# Patient Record
Sex: FEMALE | Race: WHITE | NOT HISPANIC OR LATINO | Employment: FULL TIME | URBAN - METROPOLITAN AREA
[De-identification: names, ages, dates, MRNs, and addresses within clinical notes are randomized per-mention and may not be internally consistent; named-entity substitution may affect disease eponyms.]

---

## 2013-12-10 LAB
HEP C VIRUS AB: 0.3
HIV: NON REACTIVE

## 2018-06-17 ENCOUNTER — ANESTHESIA EVENT (OUTPATIENT)
Dept: SURGERY | Facility: HOSPITAL | Age: 33
End: 2018-06-17
Payer: COMMERCIAL

## 2018-06-17 ENCOUNTER — ANESTHESIA (OUTPATIENT)
Dept: SURGERY | Facility: HOSPITAL | Age: 33
End: 2018-06-17
Payer: COMMERCIAL

## 2018-06-17 ENCOUNTER — SURGERY (OUTPATIENT)
Age: 33
End: 2018-06-17

## 2018-06-17 ENCOUNTER — HOSPITAL ENCOUNTER (OUTPATIENT)
Facility: HOSPITAL | Age: 33
LOS: 1 days | Discharge: HOME | End: 2018-06-18
Attending: EMERGENCY MEDICINE | Admitting: OBSTETRICS & GYNECOLOGY
Payer: COMMERCIAL

## 2018-06-17 DIAGNOSIS — R10.2 ACUTE PELVIC PAIN, FEMALE: ICD-10-CM

## 2018-06-17 DIAGNOSIS — R10.11 RUQ PAIN: ICD-10-CM

## 2018-06-17 DIAGNOSIS — N83.10 HEMORRHAGE OF CORPUS LUTEUM CYST: Primary | ICD-10-CM

## 2018-06-17 PROBLEM — K66.1 HEMOPERITONEUM: Status: ACTIVE | Noted: 2018-06-17

## 2018-06-17 PROBLEM — D64.9 ANEMIA: Status: ACTIVE | Noted: 2018-06-17

## 2018-06-17 LAB
ABO + RH BLD: NORMAL
ALBUMIN SERPL BCP-MCNC: 3.9 G/DL
ALP SERPL-CCNC: 70 U/L
ALT SERPL W/O P-5'-P-CCNC: 13 U/L
ANION GAP SERPL CALC-SCNC: 8 MMOL/L
AST SERPL-CCNC: 23 U/L
B-HCG UR QL: NEGATIVE
BASOPHILS # BLD AUTO: 0.06 K/UL
BASOPHILS NFR BLD: 0.5 %
BILIRUB SERPL-MCNC: 0.7 MG/DL
BILIRUB UR QL STRIP: NEGATIVE
BLD GP AB SCN CELLS X3 SERPL QL: NORMAL
BUN SERPL-MCNC: 8 MG/DL
CALCIUM SERPL-MCNC: 8.8 MG/DL
CHLORIDE SERPL-SCNC: 104 MMOL/L
CLARITY UR: CLEAR
CO2 SERPL-SCNC: 23 MMOL/L
COLOR UR: YELLOW
CREAT SERPL-MCNC: 0.5 MG/DL
DIFFERENTIAL METHOD: ABNORMAL
EOSINOPHIL # BLD AUTO: 0.1 K/UL
EOSINOPHIL NFR BLD: 0.8 %
ERYTHROCYTE [DISTWIDTH] IN BLOOD BY AUTOMATED COUNT: 12.2 %
EST. GFR  (AFRICAN AMERICAN): >60 ML/MIN/1.73 M^2
EST. GFR  (NON AFRICAN AMERICAN): >60 ML/MIN/1.73 M^2
GLUCOSE SERPL-MCNC: 102 MG/DL
GLUCOSE UR QL STRIP: NEGATIVE
HCG SERPL QL: NEGATIVE
HCT VFR BLD AUTO: 34.9 %
HGB BLD-MCNC: 12.3 G/DL
HGB UR QL STRIP: NEGATIVE
IMM GRANULOCYTES # BLD AUTO: 0.03 K/UL
IMM GRANULOCYTES NFR BLD AUTO: 0.2 %
KETONES UR QL STRIP: NEGATIVE
LEUKOCYTE ESTERASE UR QL STRIP: NEGATIVE
LIPASE SERPL-CCNC: 16 U/L
LYMPHOCYTES # BLD AUTO: 2.1 K/UL
LYMPHOCYTES NFR BLD: 17.5 %
MCH RBC QN AUTO: 30.6 PG
MCHC RBC AUTO-ENTMCNC: 35.2 G/DL
MCV RBC AUTO: 87 FL
MONOCYTES # BLD AUTO: 0.7 K/UL
MONOCYTES NFR BLD: 6.2 %
NEUTROPHILS # BLD AUTO: 9 K/UL
NEUTROPHILS NFR BLD: 74.8 %
NITRITE UR QL STRIP: NEGATIVE
NRBC BLD-RTO: 0 /100 WBC
PH UR STRIP: 7 [PH] (ref 5–8)
PLATELET # BLD AUTO: 274 K/UL
PMV BLD AUTO: 9.6 FL
POTASSIUM SERPL-SCNC: 3.9 MMOL/L
PROT SERPL-MCNC: 7.1 G/DL
PROT UR QL STRIP: NEGATIVE
RBC # BLD AUTO: 4.02 M/UL
SODIUM SERPL-SCNC: 135 MMOL/L
SP GR UR STRIP: 1.01 (ref 1–1.03)
URN SPEC COLLECT METH UR: NORMAL
UROBILINOGEN UR STRIP-ACNC: NEGATIVE EU/DL
WBC # BLD AUTO: 12.03 K/UL

## 2018-06-17 PROCEDURE — 76830 TRANSVAGINAL US NON-OB: CPT | Mod: TC

## 2018-06-17 PROCEDURE — 99285 EMERGENCY DEPT VISIT HI MDM: CPT | Mod: 25

## 2018-06-17 PROCEDURE — 36000709 HC OR TIME LEV III EA ADD 15 MIN: Performed by: OBSTETRICS & GYNECOLOGY

## 2018-06-17 PROCEDURE — 37000008 HC ANESTHESIA 1ST 15 MINUTES: Performed by: OBSTETRICS & GYNECOLOGY

## 2018-06-17 PROCEDURE — 25500020 PHARM REV CODE 255: Performed by: EMERGENCY MEDICINE

## 2018-06-17 PROCEDURE — 76830 TRANSVAGINAL US NON-OB: CPT | Mod: 26,,, | Performed by: RADIOLOGY

## 2018-06-17 PROCEDURE — 85025 COMPLETE CBC W/AUTO DIFF WBC: CPT

## 2018-06-17 PROCEDURE — 80053 COMPREHEN METABOLIC PANEL: CPT

## 2018-06-17 PROCEDURE — 87491 CHLMYD TRACH DNA AMP PROBE: CPT

## 2018-06-17 PROCEDURE — 76705 ECHO EXAM OF ABDOMEN: CPT | Mod: TC

## 2018-06-17 PROCEDURE — 81025 URINE PREGNANCY TEST: CPT

## 2018-06-17 PROCEDURE — 37000009 HC ANESTHESIA EA ADD 15 MINS: Performed by: OBSTETRICS & GYNECOLOGY

## 2018-06-17 PROCEDURE — 71000033 HC RECOVERY, INTIAL HOUR: Performed by: OBSTETRICS & GYNECOLOGY

## 2018-06-17 PROCEDURE — 25000003 PHARM REV CODE 250: Performed by: EMERGENCY MEDICINE

## 2018-06-17 PROCEDURE — 36000708 HC OR TIME LEV III 1ST 15 MIN: Performed by: OBSTETRICS & GYNECOLOGY

## 2018-06-17 PROCEDURE — 63600175 PHARM REV CODE 636 W HCPCS: Performed by: NURSE ANESTHETIST, CERTIFIED REGISTERED

## 2018-06-17 PROCEDURE — 63600175 PHARM REV CODE 636 W HCPCS: Performed by: EMERGENCY MEDICINE

## 2018-06-17 PROCEDURE — 84703 CHORIONIC GONADOTROPIN ASSAY: CPT

## 2018-06-17 PROCEDURE — 25000003 PHARM REV CODE 250: Performed by: NURSE ANESTHETIST, CERTIFIED REGISTERED

## 2018-06-17 PROCEDURE — 76856 US EXAM PELVIC COMPLETE: CPT | Mod: 26,,, | Performed by: RADIOLOGY

## 2018-06-17 PROCEDURE — 76705 ECHO EXAM OF ABDOMEN: CPT | Mod: 26,,, | Performed by: RADIOLOGY

## 2018-06-17 PROCEDURE — D9220A PRA ANESTHESIA: Mod: CRNA,,, | Performed by: NURSE ANESTHETIST, CERTIFIED REGISTERED

## 2018-06-17 PROCEDURE — 96374 THER/PROPH/DIAG INJ IV PUSH: CPT | Mod: 59

## 2018-06-17 PROCEDURE — 25000003 PHARM REV CODE 250: Performed by: OBSTETRICS & GYNECOLOGY

## 2018-06-17 PROCEDURE — D9220A PRA ANESTHESIA: Mod: ANES,,, | Performed by: ANESTHESIOLOGY

## 2018-06-17 PROCEDURE — 83690 ASSAY OF LIPASE: CPT

## 2018-06-17 PROCEDURE — 86850 RBC ANTIBODY SCREEN: CPT

## 2018-06-17 PROCEDURE — 74177 CT ABD & PELVIS W/CONTRAST: CPT | Mod: 26,,, | Performed by: RADIOLOGY

## 2018-06-17 PROCEDURE — 36415 COLL VENOUS BLD VENIPUNCTURE: CPT

## 2018-06-17 PROCEDURE — 96375 TX/PRO/DX INJ NEW DRUG ADDON: CPT

## 2018-06-17 PROCEDURE — G0378 HOSPITAL OBSERVATION PER HR: HCPCS

## 2018-06-17 PROCEDURE — 74177 CT ABD & PELVIS W/CONTRAST: CPT | Mod: TC

## 2018-06-17 PROCEDURE — 81003 URINALYSIS AUTO W/O SCOPE: CPT

## 2018-06-17 PROCEDURE — 27201423 OPTIME MED/SURG SUP & DEVICES STERILE SUPPLY: Performed by: OBSTETRICS & GYNECOLOGY

## 2018-06-17 RX ORDER — SUCCINYLCHOLINE CHLORIDE 20 MG/ML
INJECTION INTRAMUSCULAR; INTRAVENOUS
Status: DISCONTINUED | OUTPATIENT
Start: 2018-06-17 | End: 2018-06-17

## 2018-06-17 RX ORDER — SODIUM CHLORIDE, SODIUM LACTATE, POTASSIUM CHLORIDE, CALCIUM CHLORIDE 600; 310; 30; 20 MG/100ML; MG/100ML; MG/100ML; MG/100ML
1000 INJECTION, SOLUTION INTRAVENOUS
Status: COMPLETED | OUTPATIENT
Start: 2018-06-17 | End: 2018-06-17

## 2018-06-17 RX ORDER — GLYCOPYRROLATE 0.2 MG/ML
INJECTION INTRAMUSCULAR; INTRAVENOUS
Status: DISCONTINUED | OUTPATIENT
Start: 2018-06-17 | End: 2018-06-17

## 2018-06-17 RX ORDER — ONDANSETRON 2 MG/ML
INJECTION INTRAMUSCULAR; INTRAVENOUS
Status: DISCONTINUED | OUTPATIENT
Start: 2018-06-17 | End: 2018-06-17

## 2018-06-17 RX ORDER — BUPIVACAINE HYDROCHLORIDE AND EPINEPHRINE 5; 5 MG/ML; UG/ML
INJECTION, SOLUTION EPIDURAL; INTRACAUDAL; PERINEURAL
Status: DISCONTINUED | OUTPATIENT
Start: 2018-06-17 | End: 2018-06-17 | Stop reason: HOSPADM

## 2018-06-17 RX ORDER — KETOROLAC TROMETHAMINE 30 MG/ML
30 INJECTION, SOLUTION INTRAMUSCULAR; INTRAVENOUS EVERY 6 HOURS
Status: DISCONTINUED | OUTPATIENT
Start: 2018-06-18 | End: 2018-06-18 | Stop reason: HOSPADM

## 2018-06-17 RX ORDER — ONDANSETRON 2 MG/ML
4 INJECTION INTRAMUSCULAR; INTRAVENOUS
Status: COMPLETED | OUTPATIENT
Start: 2018-06-17 | End: 2018-06-17

## 2018-06-17 RX ORDER — SODIUM CHLORIDE, SODIUM LACTATE, POTASSIUM CHLORIDE, CALCIUM CHLORIDE 600; 310; 30; 20 MG/100ML; MG/100ML; MG/100ML; MG/100ML
INJECTION, SOLUTION INTRAVENOUS CONTINUOUS PRN
Status: DISCONTINUED | OUTPATIENT
Start: 2018-06-17 | End: 2018-06-17

## 2018-06-17 RX ORDER — DIPHENHYDRAMINE HYDROCHLORIDE 50 MG/ML
25 INJECTION INTRAMUSCULAR; INTRAVENOUS EVERY 4 HOURS PRN
Status: DISCONTINUED | OUTPATIENT
Start: 2018-06-17 | End: 2018-06-18

## 2018-06-17 RX ORDER — KETOROLAC TROMETHAMINE 30 MG/ML
INJECTION, SOLUTION INTRAMUSCULAR; INTRAVENOUS
Status: DISCONTINUED | OUTPATIENT
Start: 2018-06-17 | End: 2018-06-17

## 2018-06-17 RX ORDER — SODIUM CHLORIDE, SODIUM LACTATE, POTASSIUM CHLORIDE, CALCIUM CHLORIDE 600; 310; 30; 20 MG/100ML; MG/100ML; MG/100ML; MG/100ML
INJECTION, SOLUTION INTRAVENOUS CONTINUOUS
Status: DISCONTINUED | OUTPATIENT
Start: 2018-06-17 | End: 2018-06-18 | Stop reason: HOSPADM

## 2018-06-17 RX ORDER — DIPHENHYDRAMINE HYDROCHLORIDE 50 MG/ML
12.5 INJECTION INTRAMUSCULAR; INTRAVENOUS
Status: DISCONTINUED | OUTPATIENT
Start: 2018-06-17 | End: 2018-06-17 | Stop reason: HOSPADM

## 2018-06-17 RX ORDER — MORPHINE SULFATE 10 MG/ML
4 INJECTION INTRAMUSCULAR; INTRAVENOUS; SUBCUTANEOUS
Status: COMPLETED | OUTPATIENT
Start: 2018-06-17 | End: 2018-06-17

## 2018-06-17 RX ORDER — PROPOFOL 10 MG/ML
VIAL (ML) INTRAVENOUS
Status: DISCONTINUED | OUTPATIENT
Start: 2018-06-17 | End: 2018-06-17

## 2018-06-17 RX ORDER — CEFAZOLIN SODIUM 1 G/3ML
INJECTION, POWDER, FOR SOLUTION INTRAMUSCULAR; INTRAVENOUS
Status: DISCONTINUED | OUTPATIENT
Start: 2018-06-17 | End: 2018-06-17

## 2018-06-17 RX ORDER — HYDROCODONE BITARTRATE AND ACETAMINOPHEN 7.5; 325 MG/1; MG/1
1 TABLET ORAL EVERY 4 HOURS PRN
Status: DISCONTINUED | OUTPATIENT
Start: 2018-06-17 | End: 2018-06-18 | Stop reason: HOSPADM

## 2018-06-17 RX ORDER — ONDANSETRON 2 MG/ML
4 INJECTION INTRAMUSCULAR; INTRAVENOUS DAILY PRN
Status: DISCONTINUED | OUTPATIENT
Start: 2018-06-17 | End: 2018-06-17 | Stop reason: HOSPADM

## 2018-06-17 RX ORDER — HYDROCODONE BITARTRATE AND ACETAMINOPHEN 10; 325 MG/1; MG/1
1 TABLET ORAL EVERY 4 HOURS PRN
Status: DISCONTINUED | OUTPATIENT
Start: 2018-06-17 | End: 2018-06-18 | Stop reason: HOSPADM

## 2018-06-17 RX ORDER — MORPHINE SULFATE 10 MG/ML
2 INJECTION INTRAMUSCULAR; INTRAVENOUS; SUBCUTANEOUS EVERY 5 MIN PRN
Status: DISCONTINUED | OUTPATIENT
Start: 2018-06-17 | End: 2018-06-17 | Stop reason: HOSPADM

## 2018-06-17 RX ORDER — ONDANSETRON 4 MG/1
8 TABLET, ORALLY DISINTEGRATING ORAL EVERY 8 HOURS PRN
Status: DISCONTINUED | OUTPATIENT
Start: 2018-06-17 | End: 2018-06-18 | Stop reason: HOSPADM

## 2018-06-17 RX ORDER — MEPERIDINE HYDROCHLORIDE 50 MG/ML
INJECTION INTRAMUSCULAR; INTRAVENOUS; SUBCUTANEOUS
Status: DISCONTINUED | OUTPATIENT
Start: 2018-06-17 | End: 2018-06-17

## 2018-06-17 RX ORDER — DEXTROAMPHETAMINE SACCHARATE, AMPHETAMINE ASPARTATE MONOHYDRATE, DEXTROAMPHETAMINE SULFATE AND AMPHETAMINE SULFATE 5; 5; 5; 5 MG/1; MG/1; MG/1; MG/1
20 CAPSULE, EXTENDED RELEASE ORAL 2 TIMES DAILY
COMMUNITY
End: 2022-02-02

## 2018-06-17 RX ADMIN — KETOROLAC TROMETHAMINE 30 MG: 30 INJECTION, SOLUTION INTRAMUSCULAR; INTRAVENOUS at 11:06

## 2018-06-17 RX ADMIN — SODIUM CHLORIDE, POTASSIUM CHLORIDE, SODIUM LACTATE AND CALCIUM CHLORIDE: 600; 310; 30; 20 INJECTION, SOLUTION INTRAVENOUS at 08:06

## 2018-06-17 RX ADMIN — ONDANSETRON 4 MG: 2 INJECTION INTRAMUSCULAR; INTRAVENOUS at 02:06

## 2018-06-17 RX ADMIN — GLYCOPYRROLATE 0.4 MG: 0.2 INJECTION INTRAMUSCULAR; INTRAVENOUS at 08:06

## 2018-06-17 RX ADMIN — HYDROCODONE BITARTRATE AND ACETAMINOPHEN 1 TABLET: 7.5; 325 TABLET ORAL at 11:06

## 2018-06-17 RX ADMIN — ONDANSETRON 4 MG: 2 INJECTION INTRAMUSCULAR; INTRAVENOUS at 08:06

## 2018-06-17 RX ADMIN — BUPIVACAINE HYDROCHLORIDE AND EPINEPHRINE 10 ML: 5; 5 INJECTION, SOLUTION EPIDURAL; INTRACAUDAL; PERINEURAL at 08:06

## 2018-06-17 RX ADMIN — PROPOFOL 200 MG: 10 INJECTION, EMULSION INTRAVENOUS at 08:06

## 2018-06-17 RX ADMIN — MORPHINE SULFATE 4 MG: 10 INJECTION INTRAVENOUS at 02:06

## 2018-06-17 RX ADMIN — CEFAZOLIN 1 G: 330 INJECTION, POWDER, FOR SOLUTION INTRAMUSCULAR; INTRAVENOUS at 08:06

## 2018-06-17 RX ADMIN — KETOROLAC TROMETHAMINE 30 MG: 30 INJECTION, SOLUTION INTRAMUSCULAR at 09:06

## 2018-06-17 RX ADMIN — MEPERIDINE HYDROCHLORIDE 50 MG: 50 INJECTION INTRAMUSCULAR; INTRAVENOUS; SUBCUTANEOUS at 08:06

## 2018-06-17 RX ADMIN — SODIUM CHLORIDE, POTASSIUM CHLORIDE, SODIUM LACTATE AND CALCIUM CHLORIDE 1000 ML: 600; 310; 30; 20 INJECTION, SOLUTION INTRAVENOUS at 05:06

## 2018-06-17 RX ADMIN — IOHEXOL 74 ML: 350 INJECTION, SOLUTION INTRAVENOUS at 04:06

## 2018-06-17 RX ADMIN — SUCCINYLCHOLINE CHLORIDE 100 MG: 20 INJECTION, SOLUTION INTRAMUSCULAR; INTRAVENOUS at 08:06

## 2018-06-17 RX ADMIN — ONDANSETRON 8 MG: 4 TABLET, ORALLY DISINTEGRATING ORAL at 11:06

## 2018-06-17 NOTE — ANESTHESIA PREPROCEDURE EVALUATION
06/17/2018  Julieta Pacheco is a 32 y.o., female.    Anesthesia Evaluation    I have reviewed the Patient Summary Reports.    I have reviewed the Nursing Notes.   I have reviewed the Medications.     Review of Systems  Anesthesia Hx:  No previous Anesthesia  Neg history of prior surgery. Denies Family Hx of Anesthesia complications.   Denies Personal Hx of Anesthesia complications.   Social:  Smoker    Hematology/Oncology:  Hematology Normal   Oncology Normal     EENT/Dental:EENT/Dental Normal   Cardiovascular:  Cardiovascular Normal     Pulmonary:  Pulmonary Normal    Renal/:  Renal/ Normal     Hepatic/GI:  Hepatic/GI Normal    Neurological:  Neurology Normal    Endocrine:  Endocrine Normal    Dermatological:  Skin Normal    Psych:  Psychiatric Normal           Physical Exam  General:  Well nourished    Airway/Jaw/Neck:  AIRWAY FINDINGS: Normal      Eyes/Ears/Nose:  EYES/EARS/NOSE FINDINGS: Normal   Dental:  DENTAL FINDINGS: Normal   Chest/Lungs:  Chest/Lungs Clear    Heart/Vascular:  Heart Findings: Normal Heart murmur: negative Vascular Findings: Normal    Abdomen:  Abdomen Findings: Normal    Musculoskeletal:  Musculoskeletal Findings: Normal   Skin:  Skin Findings: Normal    Mental Status:  Mental Status Findings: Normal        Anesthesia Plan  Type of Anesthesia, risks & benefits discussed:  Anesthesia Type:  general  Patient's Preference:   Intra-op Monitoring Plan: standard ASA monitors  Intra-op Monitoring Plan Comments:   Post Op Pain Control Plan:   Post Op Pain Control Plan Comments:   Induction:   IV  Beta Blocker:  Patient is not currently on a Beta-Blocker (No further documentation required).       Informed Consent: Patient understands risks and agrees with Anesthesia plan.  Questions answered. Anesthesia consent signed with patient.  ASA Score: 2  emergent   Day of Surgery Review of History &  Physical:    H&P update referred to the provider.         Ready For Surgery From Anesthesia Perspective.

## 2018-06-17 NOTE — ED PROVIDER NOTES
Encounter Date: 2018       History     Chief Complaint   Patient presents with    Abdominal Pain     32-year-old female  with acute onset of lower abdominal pelvic pain last night.  She reports pain has now progressed into the right upper quadrant and has referred right shoulder pain. She denies any known fever denies any vaginal discharge or vaginal bleeding.  Her last menstrual cycle was  and normal and she has a hormone implant in the right arm.  Past surgical history unremarkable past medical history ADHD.          Review of patient's allergies indicates:  No Known Allergies  History reviewed. No pertinent past medical history.  History reviewed. No pertinent surgical history.  History reviewed. No pertinent family history.  Social History   Substance Use Topics    Smoking status: Current Every Day Smoker     Packs/day: 0.50     Types: Cigarettes    Smokeless tobacco: Never Used    Alcohol use No     Review of Systems   Constitutional: Negative.    Respiratory: Negative.    Cardiovascular: Negative for chest pain.   Gastrointestinal: Positive for abdominal pain and nausea. Negative for blood in stool, constipation, diarrhea, rectal pain and vomiting.   Genitourinary: Positive for pelvic pain. Negative for decreased urine volume, difficulty urinating, dysuria, vaginal bleeding and vaginal discharge.   Musculoskeletal: Negative.    Skin: Negative.    Hematological: Negative.    All other systems reviewed and are negative.      Physical Exam     Initial Vitals [18 1324]   BP Pulse Resp Temp SpO2   (!) 126/90 (!) 124 18 98.1 °F (36.7 °C) 96 %      MAP       --         Physical Exam    Nursing note and vitals reviewed.  Constitutional: She appears well-developed and well-nourished. She is not diaphoretic. No distress.   HENT:   Head: Normocephalic and atraumatic.   Nose: Nose normal.   Mouth/Throat: Oropharynx is clear and moist. No oropharyngeal exudate.   Eyes: Conjunctivae and EOM are  normal. Pupils are equal, round, and reactive to light. Right eye exhibits no discharge. Left eye exhibits no discharge. No scleral icterus.   Neck: Normal range of motion. Neck supple.   Cardiovascular: Regular rhythm, normal heart sounds and intact distal pulses. Tachycardia present.  Exam reveals no gallop and no friction rub.    No murmur heard.  Pulmonary/Chest: Breath sounds normal. No respiratory distress. She has no wheezes. She has no rhonchi. She has no rales.   Abdominal: Soft. Bowel sounds are normal. She exhibits no distension and no mass. There is tenderness. There is guarding (Muscular guarding RLQ and RUQ ). There is no rebound.   Musculoskeletal: Normal range of motion. She exhibits no edema or tenderness.   Lymphadenopathy:     She has no cervical adenopathy.   Neurological: She is alert and oriented to person, place, and time. She has normal strength. No cranial nerve deficit or sensory deficit.   Skin: Skin is warm and dry. Capillary refill takes less than 2 seconds. No rash noted. No erythema. No pallor.   Psychiatric: She has a normal mood and affect. Her behavior is normal. Judgment and thought content normal.         ED Course   Procedures  Labs Reviewed   CBC W/ AUTO DIFFERENTIAL - Abnormal; Notable for the following:        Result Value    Hematocrit 34.9 (*)     Gran # (ANC) 9.0 (*)     Gran% 74.8 (*)     Lymph% 17.5 (*)     All other components within normal limits   COMPREHENSIVE METABOLIC PANEL - Abnormal; Notable for the following:     Sodium 135 (*)     All other components within normal limits   LIPASE   URINALYSIS, REFLEX TO URINE CULTURE   PREGNANCY TEST, URINE RAPID   PREGNANCY TEST SCREENING, SERUM   TYPE & SCREEN          US Abdomen Limited (Gallbladder)    (Results Pending)   US Pelvis Comp with Transvag NON-OB (xpd    (Results Pending)   CT Abdomen Pelvis With Contrast    (Results Pending)     X-Rays:   Other Radiology Reports:   Discussed with Radiology : US of pelvis reveals  Left adnexal complex fluid collection with fluid in the post gutters tracking up     Concerning for ruptured ovarian cyst     Will CT to eval for potential ongoing hemorrhage and to eval for other potential general surgery issues.       US GB - Unremarkable.     Medical Decision Making:   Differential Diagnosis:   Acute abd-pelvic pain with large left adnexal complex fluid collection that may represent ruptured ovarian cyst v infectious etiology    Dr Uribe has been consulted for potential need of diagnostic laparoscopy      Patient is aware.                    ED Course as of Jun 17 1725   Sun Jun 17, 2018   1723 GYN consulted re possible ruptured O cyst - concern on Rad part for ongoing hemorrhage given appearance of US with CT pending.       [KG]      ED Course User Index  [KG] Parish Cardenas MD     Clinical Impression:   The primary encounter diagnosis was Acute pelvic pain, female. A diagnosis of RUQ pain was also pertinent to this visit.                             Parish Cardenas MD  06/17/18 2999

## 2018-06-18 PROBLEM — R10.2 ACUTE PELVIC PAIN, FEMALE: Status: RESOLVED | Noted: 2018-06-17 | Resolved: 2018-06-18

## 2018-06-18 PROBLEM — K66.1 HEMOPERITONEUM: Status: RESOLVED | Noted: 2018-06-17 | Resolved: 2018-06-18

## 2018-06-18 PROBLEM — N83.10 HEMORRHAGE OF CORPUS LUTEUM CYST: Status: RESOLVED | Noted: 2018-06-17 | Resolved: 2018-06-18

## 2018-06-18 LAB
BASOPHILS # BLD AUTO: 0.05 K/UL
BASOPHILS NFR BLD: 0.5 %
DIFFERENTIAL METHOD: ABNORMAL
EOSINOPHIL # BLD AUTO: 0.2 K/UL
EOSINOPHIL NFR BLD: 1.8 %
ERYTHROCYTE [DISTWIDTH] IN BLOOD BY AUTOMATED COUNT: 12.5 %
HCT VFR BLD AUTO: 30.1 %
HGB BLD-MCNC: 10.4 G/DL
IMM GRANULOCYTES # BLD AUTO: 0.04 K/UL
IMM GRANULOCYTES NFR BLD AUTO: 0.4 %
LYMPHOCYTES # BLD AUTO: 2.3 K/UL
LYMPHOCYTES NFR BLD: 24 %
MCH RBC QN AUTO: 30.9 PG
MCHC RBC AUTO-ENTMCNC: 34.6 G/DL
MCV RBC AUTO: 89 FL
MONOCYTES # BLD AUTO: 0.6 K/UL
MONOCYTES NFR BLD: 6.7 %
NEUTROPHILS # BLD AUTO: 6.3 K/UL
NEUTROPHILS NFR BLD: 66.6 %
NRBC BLD-RTO: 0 /100 WBC
PLATELET # BLD AUTO: 227 K/UL
PMV BLD AUTO: 9.4 FL
RBC # BLD AUTO: 3.37 M/UL
WBC # BLD AUTO: 9.51 K/UL

## 2018-06-18 PROCEDURE — 25000003 PHARM REV CODE 250: Performed by: OBSTETRICS & GYNECOLOGY

## 2018-06-18 PROCEDURE — 85025 COMPLETE CBC W/AUTO DIFF WBC: CPT

## 2018-06-18 PROCEDURE — 36415 COLL VENOUS BLD VENIPUNCTURE: CPT

## 2018-06-18 PROCEDURE — 63600175 PHARM REV CODE 636 W HCPCS: Performed by: OBSTETRICS & GYNECOLOGY

## 2018-06-18 PROCEDURE — G0378 HOSPITAL OBSERVATION PER HR: HCPCS

## 2018-06-18 RX ORDER — DIPHENHYDRAMINE HYDROCHLORIDE 50 MG/ML
25 INJECTION INTRAMUSCULAR; INTRAVENOUS EVERY 4 HOURS PRN
Status: DISCONTINUED | OUTPATIENT
Start: 2018-06-18 | End: 2018-06-18 | Stop reason: HOSPADM

## 2018-06-18 RX ORDER — HYDROCODONE BITARTRATE AND ACETAMINOPHEN 7.5; 325 MG/1; MG/1
1 TABLET ORAL EVERY 4 HOURS PRN
Qty: 25 TABLET | Refills: 0 | Status: SHIPPED | OUTPATIENT
Start: 2018-06-18 | End: 2018-12-17

## 2018-06-18 RX ORDER — IBUPROFEN 800 MG/1
800 TABLET ORAL EVERY 8 HOURS PRN
Qty: 30 TABLET | Refills: 1 | Status: SHIPPED | OUTPATIENT
Start: 2018-06-18 | End: 2018-12-17

## 2018-06-18 RX ORDER — AMOXICILLIN 250 MG
1 CAPSULE ORAL 2 TIMES DAILY
COMMUNITY
Start: 2018-06-18 | End: 2018-12-17

## 2018-06-18 RX ADMIN — HYDROCODONE BITARTRATE AND ACETAMINOPHEN 1 TABLET: 7.5; 325 TABLET ORAL at 05:06

## 2018-06-18 RX ADMIN — SODIUM CHLORIDE, POTASSIUM CHLORIDE, SODIUM LACTATE AND CALCIUM CHLORIDE: 600; 310; 30; 20 INJECTION, SOLUTION INTRAVENOUS at 12:06

## 2018-06-18 RX ADMIN — KETOROLAC TROMETHAMINE 30 MG: 30 INJECTION, SOLUTION INTRAMUSCULAR; INTRAVENOUS at 05:06

## 2018-06-18 NOTE — PLAN OF CARE
06/18/18 0858   Final Note   Assessment Type Final Discharge Note   Discharge Disposition Home   What phone number can be called within the next 1-3 days to see how you are doing after discharge? [phone]   Hospital Follow Up  Appt(s) scheduled? Yes   Discharge plans and expectations educations in teach back method with documentation complete? Yes

## 2018-06-18 NOTE — SUBJECTIVE & OBJECTIVE
Obstetric History     No data available        History reviewed. No pertinent past medical history.  History reviewed. No pertinent surgical history.    PTA Medications   Medication Sig    dextroamphetamine-amphetamine (ADDERALL XR) 20 MG 24 hr capsule Take 20 mg by mouth 2 (two) times daily.       Review of patient's allergies indicates:  No Known Allergies     Family History     None        Social History Main Topics    Smoking status: Current Every Day Smoker     Packs/day: 0.50     Types: Cigarettes    Smokeless tobacco: Never Used    Alcohol use No    Drug use: No    Sexual activity: Not Currently     Review of Systems   Constitutional: Positive for appetite change.   Respiratory: Negative.    Cardiovascular: Negative.    Gastrointestinal: Positive for abdominal pain.   Genitourinary: Positive for pelvic pain. Negative for vaginal bleeding, vaginal discharge and vaginal odor.   Musculoskeletal: Negative.    Skin:  Negative.   Neurological: Negative.    Psychiatric/Behavioral: Negative.       Objective:     Vital Signs (Most Recent):  Temp: 98.7 °F (37.1 °C) (06/17/18 1747)  Pulse: 87 (06/17/18 1747)  Resp: 18 (06/17/18 1747)  BP: (!) 126/90 (06/17/18 1324)  SpO2: 98 % (06/17/18 1747) Vital Signs (24h Range):  Temp:  [98.1 °F (36.7 °C)-98.7 °F (37.1 °C)] 98.7 °F (37.1 °C)  Pulse:  [] 87  Resp:  [18] 18  SpO2:  [96 %-98 %] 98 %  BP: (126)/(90) 126/90     Weight: 77.1 kg (170 lb)  Body mass index is 25.1 kg/m².    Patient's last menstrual period was 06/04/2018 (approximate).    Physical Exam:   Constitutional: She is oriented to person, place, and time. She appears well-developed and well-nourished.    HENT:   Head: Normocephalic and atraumatic.    Eyes: EOM are normal. Pupils are equal, round, and reactive to light.    Neck: Normal range of motion. Neck supple.    Cardiovascular: Normal rate, regular rhythm, normal heart sounds and intact distal pulses.     Pulmonary/Chest: Effort normal and  breath sounds normal.        Abdominal: There is tenderness in the right upper quadrant, right lower quadrant and left lower quadrant. There is rebound and guarding.     Genitourinary: Uterus normal. Right adnexum displays tenderness. No vaginal discharge found. Cervix exhibits motion tenderness.           Musculoskeletal: Normal range of motion.       Neurological: She is alert and oriented to person, place, and time.    Skin: Skin is warm and dry.    Psychiatric: She has a normal mood and affect. Her behavior is normal. Judgment and thought content normal.       Laboratory:  Beta HCG: No results for input(s): HCGPREGUR in the last 48 hours.  CBC:   Recent Labs  Lab 06/17/18  1412   WBC 12.03   RBC 4.02   HGB 12.3   HCT 34.9*      MCV 87   MCH 30.6   MCHC 35.2     CMP:   Recent Labs  Lab 06/17/18  1412      CALCIUM 8.8   ALBUMIN 3.9   PROT 7.1   *   K 3.9   CO2 23      BUN 8   CREATININE 0.5   ALKPHOS 70   ALT 13   AST 23   BILITOT 0.7       Diagnostic Results:  CT: Reviewed  complex fluid in the pelvis to paracolic gutters

## 2018-06-18 NOTE — BRIEF OP NOTE
[facility] - Periop Services  Brief Operative Note     SUMMARY     Surgery Date: 2018     Surgeon(s) and Role:     * Win Uribe MD - Primary    Assisting Surgeon: None    Pre-op Diagnosis:  Abdominal pain, unspecified abdominal location [R10.9]    Post-op Diagnosis:  Post-Op Diagnosis Codes:     * Abdominal pain, unspecified abdominal location [R10.9]    Procedure(s) (LRB):  LAPAROSCOPY, DIAGNOSTIC (N/A)    Anesthesia: General    Description of the findings of the procedure: 300 cc hemoperitoneum evacuated. Left ovary with stoma    Findings/Key Components: no complications    Estimated Blood Loss: 50 mL         Specimens:   Specimen (12h ago through future)    None          Discharge Note    SUMMARY     Admit Date: 2018    Discharge Date and Time: 2018    Hospital Course (synopsis of major diagnoses, care, treatment, and services provided during the course of the hospital stay): 33 yo  LMP 2 weeks ago presents with c/o abdominal/pelvic pain especially in the RUQ. No fevers no d/c no douching has Nexplanon for BC. Pain has worsened over past few days.  CT revealed complex fluid in the lower pelvis into the paracolic gutters    Final Diagnosis: Post-Op Diagnosis Codes:     * Abdominal pain, unspecified abdominal location [R10.9]  Ruptured Corpus Luteum      Disposition: Home or Self Care    Follow Up/Patient Instructions: 2 weeks with Dr Uribe    Medications:  Reconciled Home Medications:      Medication List      ASK your doctor about these medications    dextroamphetamine-amphetamine 20 MG 24 hr capsule  Commonly known as:  ADDERALL XR  Take 20 mg by mouth 2 (two) times daily.          No discharge procedures on file.

## 2018-06-18 NOTE — H&P
[facility] - Peri Services  Obstetrics & Gynecology  History & Physical    Patient Name: Julieta Pacheco  MRN: [id number]  Admission Date: 2018  Primary Care Provider: Lamin Moura Jr, MD    Subjective:     Chief Complaint/Reason for Admission: abd/pelvic pain   History of Present Illness:  31 yo  LMP 2 weeks ago presents with c/o abdominal/pelvic pain especially in the RUQ. No fevers no d/c no douching has Nexplanon for BC. Pain has worsened over past few days.  CT revealed complex fluid in the lower pelvis into the paracolic gutters.        Obstetric History     No data available        History reviewed. No pertinent past medical history.  History reviewed. No pertinent surgical history.    PTA Medications   Medication Sig    dextroamphetamine-amphetamine (ADDERALL XR) 20 MG 24 hr capsule Take 20 mg by mouth 2 (two) times daily.       Review of patient's allergies indicates:  No Known Allergies     Family History     None        Social History Main Topics    Smoking status: Current Every Day Smoker     Packs/day: 0.50     Types: Cigarettes    Smokeless tobacco: Never Used    Alcohol use No    Drug use: No    Sexual activity: Not Currently     Review of Systems   Constitutional: Positive for appetite change.   Respiratory: Negative.    Cardiovascular: Negative.    Gastrointestinal: Positive for abdominal pain.   Genitourinary: Positive for pelvic pain. Negative for vaginal bleeding, vaginal discharge and vaginal odor.   Musculoskeletal: Negative.    Skin:  Negative.   Neurological: Negative.    Psychiatric/Behavioral: Negative.       Objective:     Vital Signs (Most Recent):  Temp: 98.7 °F (37.1 °C) (18 174)  Pulse: 87 (18 174)  Resp: 18 (18 174)  BP: (!) 126/90 (18 1324)  SpO2: 98 % (18 174) Vital Signs (24h Range):  Temp:  [98.1 °F (36.7 °C)-98.7 °F (37.1 °C)] 98.7 °F (37.1 °C)  Pulse:  [] 87  Resp:  [18] 18  SpO2:  [96 %-98 %] 98 %  BP:  (126)/(90) 126/90     Weight: 77.1 kg (170 lb)  Body mass index is 25.1 kg/m².    Patient's last menstrual period was 06/04/2018 (approximate).    Physical Exam:   Constitutional: She is oriented to person, place, and time. She appears well-developed and well-nourished.    HENT:   Head: Normocephalic and atraumatic.    Eyes: EOM are normal. Pupils are equal, round, and reactive to light.    Neck: Normal range of motion. Neck supple.    Cardiovascular: Normal rate, regular rhythm, normal heart sounds and intact distal pulses.     Pulmonary/Chest: Effort normal and breath sounds normal.        Abdominal: There is tenderness in the right upper quadrant, right lower quadrant and left lower quadrant. There is rebound and guarding.     Genitourinary: Uterus normal. Right adnexum displays tenderness. No vaginal discharge found. Cervix exhibits motion tenderness.           Musculoskeletal: Normal range of motion.       Neurological: She is alert and oriented to person, place, and time.    Skin: Skin is warm and dry.    Psychiatric: She has a normal mood and affect. Her behavior is normal. Judgment and thought content normal.       Laboratory:  Beta HCG: No results for input(s): HCGPREGUR in the last 48 hours.  CBC:   Recent Labs  Lab 06/17/18  1412   WBC 12.03   RBC 4.02   HGB 12.3   HCT 34.9*      MCV 87   MCH 30.6   MCHC 35.2     CMP:   Recent Labs  Lab 06/17/18  1412      CALCIUM 8.8   ALBUMIN 3.9   PROT 7.1   *   K 3.9   CO2 23      BUN 8   CREATININE 0.5   ALKPHOS 70   ALT 13   AST 23   BILITOT 0.7       Diagnostic Results:  CT: Reviewed  complex fluid in the pelvis to paracolic gutters    Assessment/Plan:     Ruptured Corpus Luteum, Hemoperitoneum   Consents on chart..... To OR for DX LAP     Win Uribe MD  Obstetrics & Gynecology  [facility] - Periop Services

## 2018-06-18 NOTE — DISCHARGE SUMMARY
[facility] - Post Partum  Obstetrics & Gynecology  Discharge Summary    Patient Name: Julieta Pacheco  MRN: [id number]  Admission Date: 2018  Hospital Length of Stay: 1 days  Discharge Date and Time:  2018 7:58 AM  Attending Physician: No att. providers found   Discharging Provider: Win Uribe MD  Primary Care Provider: Lamin Moura Jr, MD    HPI:33 yo  LMP 2 weeks ago presents with c/o abdominal/pelvic pain especially in the RUQ. No fevers no d/c no douching has Nexplanon for BC. Pain has worsened over past few days.  CT revealed complex fluid in the lower pelvis into the paracolic gutters.       Hospital Course:  Pt had DX LAP with evacuation of hemoperitoneum  Procedure(s) (LRB):  LAPAROSCOPY, DIAGNOSTIC (N/A)     Consults:     Significant Diagnostic Studies: Radiology: CT scan: CT ABDOMEN PELVIS WITH CONTRAST: No results found for this visit on 18.  Ultrasound: complex fluid in pelvis and into paracolic gutters    Pending Diagnostic Studies:     Procedure Component Value Units Date/Time    CT Abdomen Pelvis With Contrast [[id number]] Updated:  18 1724    Order Status:  Sent Lab Status:  In process     US Abdomen Limited (Gallbladder) [[id number]] Updated:  18 1550    Order Status:  Sent Lab Status:  In process     US Pelvis Comp with Transvag NON-OB (xpd [[id number]] Updated:  18 1550    Order Status:  Sent Lab Status:  In process         Final Active Diagnoses:    Diagnosis Date Noted POA    PRINCIPAL PROBLEM:  Hemorrhage of corpus luteum cyst [N83.10] 2018 Yes    Anemia [D64.9] 2018 Yes    Hemoperitoneum [K66.1] 2018 Yes    Acute pelvic pain, female [R10.2] 2018 Yes      Problems Resolved During this Admission:    Diagnosis Date Noted Date Resolved POA        Discharged Condition: stable    Disposition: Home or Self Care    Follow Up:  Follow-up Information     Win Uribe MD. Schedule an appointment as soon as  possible for a visit in 2 weeks.    Specialty:  Obstetrics  Contact information:  [address]  [facility] [place] [address] [phone]                 Patient Instructions:     Diet general     Activity as tolerated     Other restrictions (specify):   Order Comments: Do not have sex, use tampons, or douche     Call MD for:  temperature >100.4     Call MD for:  persistent nausea and vomiting     Call MD for:  severe uncontrolled pain     Call MD for:  difficulty breathing, headache or visual disturbances     Call MD for:  redness, tenderness, or signs of infection (pain, swelling, redness, odor or green/yellow discharge around incision site)       Medications:  Reconciled Home Medications:      Medication List      START taking these medications    HYDROcodone-acetaminophen 7.5-325 mg per tablet  Commonly known as:  NORCO  Take 1 tablet by mouth every 4 (four) hours as needed.     ibuprofen 800 MG tablet  Commonly known as:  ADVIL,MOTRIN  Take 1 tablet (800 mg total) by mouth every 8 (eight) hours as needed for Pain.     senna-docusate 8.6-50 mg 8.6-50 mg per tablet  Commonly known as:  PERICOLACE  Take 1 tablet by mouth 2 (two) times daily.        CONTINUE taking these medications    dextroamphetamine-amphetamine 20 MG 24 hr capsule  Commonly known as:  ADDERALL XR  Take 20 mg by mouth 2 (two) times daily.            Win Uribe MD  Obstetrics & Gynecology  [facility] - Post Partum

## 2018-06-18 NOTE — TRANSFER OF CARE
"Anesthesia Transfer of Care Note    Patient: Julieta Pacheco    Procedure(s) Performed: Procedure(s) (LRB):  LAPAROSCOPY, DIAGNOSTIC (N/A)    Patient location: PACU    Anesthesia Type: general    Transport from OR: Transported from OR on room air with adequate spontaneous ventilation    Post pain: adequate analgesia    Post assessment: no apparent anesthetic complications and tolerated procedure well    Post vital signs: stable    Level of consciousness: awake, alert and oriented    Nausea/Vomiting: no nausea/vomiting    Complications: none    Transfer of care protocol was followed      Last vitals:   Visit Vitals  BP (!) 126/90 (BP Location: Left arm, Patient Position: Sitting)   Pulse 87   Temp 37.1 °C (98.7 °F) (Oral)   Resp 18   Ht 5' 9" (1.753 m)   Wt 77.1 kg (170 lb)   LMP 06/04/2018 (Approximate)   SpO2 98%   BMI 25.10 kg/m²     "

## 2018-06-18 NOTE — OP NOTE
DATE OF PROCEDURE:  06/17/2018.    PREOPERATIVE DIAGNOSIS:  Abdominal pelvic pain, hemoperitoneum.    POSTOPERATIVE DIAGNOSIS:  Ruptured hemorrhagic corpus luteum and  hemoperitoneum.    PROCEDURE PERFORMED:  Diagnostic laparoscopy with evacuation of  hemoperitoneum.    ANESTHESIA:  General endotracheal anesthesia.    ESTIMATED BLOOD LOSS:  Approximately 300 mL of hemoperitoneum evacuated.    DESCRIPTION OF FINDINGS:  The left ovary had a hemorrhagic stoma.  No other  abnormal findings were noted.  The liver edge and gallbladder were normal.   The appendix was within normal limits.  There was no evidence of  endometriosis.  Both tubes and ovaries were normal except for the stoma  noted.  There were some filmy adhesions of that left ovary to the pelvic  sidewall.    ANESTHESIA:  General endotracheal anesthesia.    PROCEDURE IN DETAIL:  After assuring informed consent, the patient was  taken to the OR where general anesthesia was initiated.  She was placed in  the adjustable Andres stirrups and her abdomen and perineum were prepped and  draped in the usual sterile fashion.  A Saucedo catheter was placed inside  the bladder.  A sterile speculum was placed inside the vagina and a Hulka  clamp was placed inside the uterus for manipulation.  Instruments were  removed from the vagina.  The speculum was removed from the vagina.  Gloves  were changed and attention was turned to the patient's abdomen.  Towel  clamps were placed periumbilically and a Veress needle was introduced.  The  abdomen was insufflated to 50 mmHg.  A 5-mm incision was made in the  umbilicus and a 5 mm trocar and sleeve were advanced.  Intra-abdominal  placement was assured with direct laparoscopic visualization.  The above  findings were noted.  Next, a 5-mm incision was made in the suprapubic  region and 5-mm trocar and sleeve were advanced under direct laparoscopic  visualization.  Copious irrigation was used to suction the hemoperitoneum.   The pelvic  anatomy was explored and the left stoma was noted.  The spatula  was used to cauterize the stoma site.  Good hemostasis was achieved.  At  this point, all instruments were removed from the patient's abdomen.  The  gas was removed from the patient's abdomen and additional positive pressure  ventilation was used to remove the pneumoperitoneum.  The skin was  reapproximated with interrupted 4-0 Vicryl sutures and all instruments were  removed from the patient's vagina.  She was taken out of the adjustable  Andres stirrups and placed in supine position.  She was awakened from  anesthesia and taken to the Recovery Room in stable condition.  All counts  were correct x2.  The patient tolerated the procedure well and was taken to  Recovery Room in stable condition.  There were no other complications.        NMD/IN dd: 06/18/2018 09:11:38 (CDT)   td: 06/18/2018 12:07:22 (CDT)  Doc ID #[id number]   Job ID #[id number]    CC:

## 2018-06-18 NOTE — HOSPITAL COURSE
31 yo  LMP 2 weeks ago presents with c/o abdominal/pelvic pain especially in the RUQ. No fevers no d/c no douching has Nexplanon for BC. Pain has worsened over past few days.  CT revealed complex fluid in the lower pelvis into the paracolic gutters.

## 2018-06-18 NOTE — PLAN OF CARE
06/18/18 0856   Discharge Assessment   Assessment Type Discharge Planning Assessment   Confirmed/corrected address and phone number on facesheet? Yes   Assessment information obtained from? Patient   Expected Length of Stay (days) 1   Communicated expected length of stay with patient/caregiver yes   Prior to hospitilization cognitive status: Alert/Oriented   Prior to hospitalization functional status: Independent   Current cognitive status: Alert/Oriented   Current Functional Status: Independent   Lives With child(ronen), dependent;parent(s);sibling(s)   Able to Return to Prior Arrangements yes   Is patient able to care for self after discharge? Yes   Who are your caregiver(s) and their phone number(s)? Sister Jad Rendon [phone]   Patient's perception of discharge disposition home or selfcare   Readmission Within The Last 30 Days no previous admission in last 30 days   Patient currently being followed by outpatient case management? No   Patient currently receives any other outside agency services? No   Equipment Currently Used at Home none   Do you have any problems affording any of your prescribed medications? No   Is the patient taking medications as prescribed? yes   Does the patient have transportation home? Yes   Transportation Available none;car   Does the patient receive services at the [facility]? No   Discharge Plan A Home

## 2018-06-18 NOTE — NURSING
Patient discharged with all belongings. She is stable. Voiding without difficulty, passing flatus, tolerating Po. Pt verbalized understanding of all instructions. All questions answered. Escorted to car.

## 2018-06-18 NOTE — ANESTHESIA POSTPROCEDURE EVALUATION
"Anesthesia Post Evaluation    Patient: Julieta Pacheco    Procedure(s) Performed: Procedure(s) (LRB):  LAPAROSCOPY, DIAGNOSTIC (N/A)    Final Anesthesia Type: general  Patient location during evaluation: PACU  Patient participation: Yes- Able to Participate  Level of consciousness: awake and awake and alert  Post-procedure vital signs: reviewed and stable  Pain management: adequate  Airway patency: patent  PONV status at discharge: No PONV  Anesthetic complications: no      Cardiovascular status: blood pressure returned to baseline  Respiratory status: unassisted and spontaneous ventilation  Hydration status: euvolemic  Follow-up not needed.        Visit Vitals  BP (!) 126/90 (BP Location: Left arm, Patient Position: Sitting)   Pulse 87   Temp 37.1 °C (98.7 °F) (Oral)   Resp 18   Ht 5' 9" (1.753 m)   Wt 77.1 kg (170 lb)   LMP 06/04/2018 (Approximate)   SpO2 98%   BMI 25.10 kg/m²       Pain/Lisa Score: Pain Rating Prior to Med Admin: 7 (6/17/2018  2:05 PM)      "
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
## 2018-06-19 LAB
C TRACH DNA SPEC QL NAA+PROBE: NOT DETECTED
N GONORRHOEA DNA SPEC QL NAA+PROBE: NOT DETECTED

## 2018-07-10 VITALS
WEIGHT: 170 LBS | HEART RATE: 71 BPM | OXYGEN SATURATION: 99 % | DIASTOLIC BLOOD PRESSURE: 61 MMHG | SYSTOLIC BLOOD PRESSURE: 100 MMHG | HEIGHT: 69 IN | RESPIRATION RATE: 18 BRPM | TEMPERATURE: 98 F | BODY MASS INDEX: 25.18 KG/M2

## 2018-12-17 ENCOUNTER — HOSPITAL ENCOUNTER (EMERGENCY)
Facility: HOSPITAL | Age: 33
Discharge: HOME | End: 2018-12-17
Attending: EMERGENCY MEDICINE
Payer: COMMERCIAL

## 2018-12-17 VITALS
SYSTOLIC BLOOD PRESSURE: 121 MMHG | WEIGHT: 165 LBS | BODY MASS INDEX: 24.44 KG/M2 | RESPIRATION RATE: 18 BRPM | TEMPERATURE: 98 F | HEART RATE: 79 BPM | HEIGHT: 69 IN | OXYGEN SATURATION: 99 % | DIASTOLIC BLOOD PRESSURE: 86 MMHG

## 2018-12-17 DIAGNOSIS — S60.212A CONTUSION OF LEFT WRIST, INITIAL ENCOUNTER: ICD-10-CM
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
## 2018-12-17 DIAGNOSIS — S16.1XXA CERVICAL STRAIN, ACUTE, INITIAL ENCOUNTER: Primary | ICD-10-CM

## 2018-12-17 DIAGNOSIS — M54.2 NECK PAIN: ICD-10-CM

## 2018-12-17 PROCEDURE — 99284 EMERGENCY DEPT VISIT MOD MDM: CPT | Mod: 25

## 2018-12-17 PROCEDURE — 72050 X-RAY EXAM NECK SPINE 4/5VWS: CPT | Mod: 26,,, | Performed by: RADIOLOGY

## 2018-12-17 PROCEDURE — 73110 X-RAY EXAM OF WRIST: CPT | Mod: TC,FY,LT

## 2018-12-17 PROCEDURE — 72050 X-RAY EXAM NECK SPINE 4/5VWS: CPT | Mod: TC,FY

## 2018-12-17 PROCEDURE — 73110 X-RAY EXAM OF WRIST: CPT | Mod: 26,LT,, | Performed by: RADIOLOGY

## 2018-12-17 RX ORDER — CITALOPRAM 20 MG/1
20 TABLET, FILM COATED ORAL DAILY
COMMUNITY

## 2018-12-17 RX ORDER — METHOCARBAMOL 750 MG/1
750 TABLET, FILM COATED ORAL 4 TIMES DAILY
Qty: 40 TABLET | Refills: 0 | Status: SHIPPED | OUTPATIENT
Start: 2018-12-17 | End: 2018-12-27

## 2018-12-18 NOTE — ED NOTES
DISCHARGE INSTRUCTIONS GIVEN, DISCUSSED MEDICATIONS AND FOLLOW UP CARE, PATIENT VERBALIZED UNDERSTANDING, NO QUESTIONS OR COMPLAINTS AT TIME, ENCOURAGED TO RETURN FOR NEW OR WORSENING SYMPTOMS. PATIENT ESCORTED TO REGISTRATION W/O SRINI. DAREN TAI RN
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
## 2020-07-16 LAB
CHOLEST SERPL-MSCNC: 128 MG/DL (ref 0–200)
HDLC SERPL-MCNC: 53 MG/DL
LDLC SERPL CALC-MCNC: 61 MG/DL
TRIGL SERPL-MCNC: 41 MG/DL

## 2021-08-15 ENCOUNTER — HOSPITAL ENCOUNTER (EMERGENCY)
Facility: HOSPITAL | Age: 36
Discharge: LEFT BEFORE DISCHARGE | End: 2021-08-15
Attending: EMERGENCY MEDICINE
Payer: COMMERCIAL

## 2021-08-15 VITALS
RESPIRATION RATE: 16 BRPM | TEMPERATURE: 98 F | SYSTOLIC BLOOD PRESSURE: 109 MMHG | WEIGHT: 192 LBS | BODY MASS INDEX: 28.44 KG/M2 | DIASTOLIC BLOOD PRESSURE: 75 MMHG | OXYGEN SATURATION: 98 % | HEIGHT: 69 IN | HEART RATE: 82 BPM

## 2021-08-15 DIAGNOSIS — R09.89 RUNNY NOSE: Primary | ICD-10-CM

## 2021-08-15 LAB — SARS-COV-2 RDRP RESP QL NAA+PROBE: NEGATIVE

## 2021-08-15 PROCEDURE — U0002 COVID-19 LAB TEST NON-CDC: HCPCS | Performed by: PHYSICIAN ASSISTANT

## 2021-08-15 PROCEDURE — 99282 EMERGENCY DEPT VISIT SF MDM: CPT

## 2021-08-15 RX ORDER — ARIPIPRAZOLE 5 MG/1
5 TABLET ORAL
COMMUNITY
Start: 2021-04-14

## 2021-10-13 ENCOUNTER — OFFICE VISIT (OUTPATIENT)
Dept: OBSTETRICS AND GYNECOLOGY | Facility: CLINIC | Age: 36
End: 2021-10-13
Payer: COMMERCIAL

## 2021-10-13 VITALS
SYSTOLIC BLOOD PRESSURE: 126 MMHG | DIASTOLIC BLOOD PRESSURE: 84 MMHG | WEIGHT: 193 LBS | HEIGHT: 70 IN | BODY MASS INDEX: 27.63 KG/M2

## 2021-10-13 DIAGNOSIS — Z00.00 WELLNESS EXAMINATION: Primary | ICD-10-CM

## 2021-10-13 LAB
B-HCG UR QL: NEGATIVE
CTP QC/QA: YES

## 2021-10-13 PROCEDURE — 87491 CHLMYD TRACH DNA AMP PROBE: CPT | Performed by: NURSE PRACTITIONER

## 2021-10-13 PROCEDURE — 96372 THER/PROPH/DIAG INJ SC/IM: CPT | Mod: S$GLB,,, | Performed by: NURSE PRACTITIONER

## 2021-10-13 PROCEDURE — 99385 PR PREVENTIVE VISIT,NEW,18-39: ICD-10-PCS | Mod: 25,S$GLB,, | Performed by: NURSE PRACTITIONER

## 2021-10-13 PROCEDURE — 81025 URINE PREGNANCY TEST: CPT | Mod: S$GLB,,, | Performed by: NURSE PRACTITIONER

## 2021-10-13 PROCEDURE — 87591 N.GONORRHOEAE DNA AMP PROB: CPT | Performed by: NURSE PRACTITIONER

## 2021-10-13 PROCEDURE — 99385 PREV VISIT NEW AGE 18-39: CPT | Mod: 25,S$GLB,, | Performed by: NURSE PRACTITIONER

## 2021-10-13 PROCEDURE — 81025 POCT URINE PREGNANCY: ICD-10-PCS | Mod: S$GLB,,, | Performed by: NURSE PRACTITIONER

## 2021-10-13 PROCEDURE — 96372 PR INJECTION,THERAP/PROPH/DIAG2ST, IM OR SUBCUT: ICD-10-PCS | Mod: S$GLB,,, | Performed by: NURSE PRACTITIONER

## 2021-10-13 RX ORDER — MEDROXYPROGESTERONE ACETATE 150 MG/ML
150 INJECTION, SUSPENSION INTRAMUSCULAR
Status: SHIPPED | OUTPATIENT
Start: 2021-10-13 | End: 2023-01-06

## 2021-10-13 RX ADMIN — MEDROXYPROGESTERONE ACETATE 150 MG: 150 INJECTION, SUSPENSION INTRAMUSCULAR at 09:10

## 2021-10-14 LAB
C TRACH DNA SPEC QL NAA+PROBE: NOT DETECTED
N GONORRHOEA DNA SPEC QL NAA+PROBE: NOT DETECTED

## 2021-11-09 ENCOUNTER — PATIENT OUTREACH (OUTPATIENT)
Dept: ADMINISTRATIVE | Facility: HOSPITAL | Age: 36
End: 2021-11-09
Payer: COMMERCIAL

## 2021-11-09 ENCOUNTER — OFFICE VISIT (OUTPATIENT)
Dept: FAMILY MEDICINE | Facility: CLINIC | Age: 36
End: 2021-11-09
Payer: COMMERCIAL

## 2021-11-09 VITALS
OXYGEN SATURATION: 98 % | DIASTOLIC BLOOD PRESSURE: 77 MMHG | HEIGHT: 70 IN | WEIGHT: 191.38 LBS | HEART RATE: 91 BPM | BODY MASS INDEX: 27.4 KG/M2 | SYSTOLIC BLOOD PRESSURE: 123 MMHG

## 2021-11-09 DIAGNOSIS — R91.8 OTHER NONSPECIFIC ABNORMAL FINDING OF LUNG FIELD: ICD-10-CM

## 2021-11-09 DIAGNOSIS — R91.8 MULTIPLE SUBSOLID LUNG NODULES LESS THAN 6 MM IN DIAMETER: ICD-10-CM

## 2021-11-09 DIAGNOSIS — Z76.89 ESTABLISHING CARE WITH NEW DOCTOR, ENCOUNTER FOR: Primary | ICD-10-CM

## 2021-11-09 LAB
ALBUMIN SERPL BCP-MCNC: 4.3 G/DL (ref 3.5–5.2)
ALP SERPL-CCNC: 64 U/L (ref 55–135)
ALT SERPL W/O P-5'-P-CCNC: 21 U/L (ref 10–44)
ANION GAP SERPL CALC-SCNC: 8 MMOL/L (ref 8–16)
AST SERPL-CCNC: 15 U/L (ref 10–40)
BILIRUB SERPL-MCNC: 0.2 MG/DL (ref 0.1–1)
BUN SERPL-MCNC: 8 MG/DL (ref 6–20)
CALCIUM SERPL-MCNC: 8.8 MG/DL (ref 8.7–10.5)
CHLORIDE SERPL-SCNC: 110 MMOL/L (ref 95–110)
CO2 SERPL-SCNC: 20 MMOL/L (ref 23–29)
CREAT SERPL-MCNC: 0.7 MG/DL (ref 0.5–1.4)
EST. GFR  (AFRICAN AMERICAN): >60 ML/MIN/1.73 M^2
EST. GFR  (NON AFRICAN AMERICAN): >60 ML/MIN/1.73 M^2
GLUCOSE SERPL-MCNC: 107 MG/DL (ref 70–110)
POTASSIUM SERPL-SCNC: 3.8 MMOL/L (ref 3.5–5.1)
PROT SERPL-MCNC: 7.5 G/DL (ref 6–8.4)
SODIUM SERPL-SCNC: 138 MMOL/L (ref 136–145)

## 2021-11-09 PROCEDURE — 99204 PR OFFICE/OUTPT VISIT, NEW, LEVL IV, 45-59 MIN: ICD-10-PCS | Mod: S$GLB,,, | Performed by: FAMILY MEDICINE

## 2021-11-09 PROCEDURE — 99999 PR PBB SHADOW E&M-EST. PATIENT-LVL IV: CPT | Mod: PBBFAC,,, | Performed by: FAMILY MEDICINE

## 2021-11-09 PROCEDURE — 80053 COMPREHEN METABOLIC PANEL: CPT | Performed by: FAMILY MEDICINE

## 2021-11-09 PROCEDURE — 99999 PR PBB SHADOW E&M-EST. PATIENT-LVL IV: ICD-10-PCS | Mod: PBBFAC,,, | Performed by: FAMILY MEDICINE

## 2021-11-09 PROCEDURE — 99204 OFFICE O/P NEW MOD 45 MIN: CPT | Mod: S$GLB,,, | Performed by: FAMILY MEDICINE

## 2021-11-09 RX ORDER — DEXTROAMPHETAMINE SACCHARATE, AMPHETAMINE ASPARTATE, DEXTROAMPHETAMINE SULFATE AND AMPHETAMINE SULFATE 5; 5; 5; 5 MG/1; MG/1; MG/1; MG/1
1 TABLET ORAL 2 TIMES DAILY
COMMUNITY
Start: 2021-10-25

## 2021-11-09 NOTE — PROGRESS NOTES
Chief Complaint   Patient presents with    Establish Care           Patient is new to clinic, here to establish care.   Current complaints/concerns:  *previous CT abd (2018) noted to have multiple lung nodules; however she has never had any follow-up after this  *  *  *          Review of patient's allergies indicates:  No Known Allergies    Current Outpatient Medications on File Prior to Visit   Medication Sig Dispense Refill    ARIPiprazole (ABILIFY) 5 MG Tab Take 5 mg by mouth.      citalopram (CELEXA) 20 MG tablet Take 20 mg by mouth once daily.      dextroamphetamine-amphetamine (ADDERALL XR) 20 MG 24 hr capsule Take 20 mg by mouth 2 (two) times daily.      dextroamphetamine-amphetamine (ADDERALL) 20 mg tablet Take 1 tablet by mouth 2 (two) times daily.       Current Facility-Administered Medications on File Prior to Visit   Medication Dose Route Frequency Provider Last Rate Last Admin    medroxyPROGESTERone (DEPO-PROVERA) injection 150 mg  150 mg Intramuscular Q90 Days Felicia Rubio NP   150 mg at 10/13/21 0948       Past Medical History:   Diagnosis Date    ADD (attention deficit disorder)     Anxiety     Anxiety     Depression     Hypertension       Past Surgical History:   Procedure Laterality Date    DIAGNOSTIC LAPAROSCOPY N/A 6/17/2018    Procedure: LAPAROSCOPY, DIAGNOSTIC;  Surgeon: Win Uribe MD;  Location: [facility] OR;  Service: OB/GYN;  Laterality: N/A;    OVARIAN CYST SURGERY Left        Social History     Tobacco Use    Smoking status: Former Smoker     Packs/day: 1.00     Years: 15.00     Pack years: 15.00     Types: Cigarettes     Quit date: 8/17/2018     Years since quitting: 3.3    Smokeless tobacco: Never Used   Substance Use Topics    Alcohol use: Yes    Drug use: Never        Family History   Problem Relation Age of Onset    Hypertension Father     Diabetes Father     Hypertension Mother     Hypertension Brother     Diabetes Brother     Hypertension Sister  "    Diabetes Sister     Breast cancer Maternal Aunt     Lung cancer Neg Hx     Colon cancer Neg Hx         Review of Systems   Constitutional: Negative for fatigue.   Respiratory: Negative for cough, chest tightness, shortness of breath and wheezing.    Cardiovascular: Negative for chest pain.        /77 (BP Location: Right arm, Patient Position: Sitting, BP Method: Medium (Automatic))   Pulse 91   Ht 5' 9.5" (1.765 m)   Wt 86.8 kg (191 lb 5.8 oz)   LMP 09/10/2021 (Approximate)   SpO2 98%   BMI 27.85 kg/m²     Physical Exam  Vitals and nursing note reviewed.   Constitutional:       Appearance: Normal appearance.   HENT:      Head: Normocephalic and atraumatic.   Eyes:      Conjunctiva/sclera: Conjunctivae normal.      Pupils: Pupils are equal, round, and reactive to light.   Cardiovascular:      Rate and Rhythm: Normal rate and regular rhythm.      Heart sounds: Normal heart sounds. No murmur heard.      Pulmonary:      Effort: Pulmonary effort is normal. No respiratory distress.      Breath sounds: Normal breath sounds and air entry. No stridor. No decreased breath sounds, wheezing, rhonchi or rales.   Neurological:      Mental Status: She is alert and oriented to person, place, and time.      Motor: Motor function is intact.      Coordination: Coordination is intact.      Gait: Gait is intact.   Psychiatric:         Attention and Perception: Attention and perception normal.         Mood and Affect: Mood and affect normal.         Speech: Speech normal.         Behavior: Behavior normal. Behavior is cooperative.         Thought Content: Thought content normal.         Cognition and Memory: Cognition and memory normal.         Judgment: Judgment normal.                 REPORT: reviewed, noted below    Julieta Pacheco, 36FContact [employer] Support  YOB: 1985  Recent Address:  View Linked Records (3)  [place]  RX Summary  Summary  Total Prescriptions 17  Total Private Pay 1  Total " Prescribers 1  Total Pharmacies 2  Opioids* (excluding Buprenorphine)  Current Qty 0  Current MME/day 0.00  30 Day Avg MME/day 0.00  Buprenorphine*  Current Qty 0  Current mg/day 0.00  30 Day Avg mg/day 0.00  State Indicators (0)  Details  Prescriptions  Total: 17  Private Pay: 1  Showing 1-15 of 17 Items View 15 Items    1   of 2   Filled  Written  ID  Drug  QTY  Days  Prescriber  RX #  Dispenser  Refill  Daily Dose*  Pymt Type     10/25/2021 09/01/2021 3   Dextroamp-Amphetamin 20 Mg Tab  60.00 30 Ro Cli [id number] [place] (1878) 0  [place]  09/01/2021 09/01/2021 3   Dextroamp-Amphetamin 20 Mg Tab  60.00 30 Ro Cli [id number] [place] (1878) 0  [place]  07/13/2021 04/14/2021 1   Dextroamp-Amphetamin 20 Mg Tab  60.00 30 Ro Cli [id number] Mem (9158) 0  [place]  06/09/2021 06/08/2021 1   Dextroamp-Amphetamin 20 Mg Tab  60.00 30 Ro Cli [id number] Mem (9158) 0  [place]  04/12/2021 12/16/2020 2   Dextroamp-Amphetamin 20 Mg Tab  60.00 30 Ro Cli [id number] Mem (9158) 0  [place]  03/04/2021 01/21/2021 1   Dextroamp-Amphetamin 20 Mg Tab  60.00 30 Ro Cli [id number] Mem (9158) 0  [place]  01/22/2021 12/16/2020 1   Dextroamp-Amphetamin 20 Mg Tab  60.00 30 Ro Cli [id number] Mem (9158) 0  [place]  12/16/2020 12/16/2020 1   Dextroamp-Amphetamin 20 Mg Tab  60.00 30 Ro Cli [id number] Mem (9158) 0  [place]  10/12/2020 09/25/2020 3   Dextroamp-Amphetamin 20 Mg Tab  60.00 30 Ro Cli [id number] Lov (1878) 0  [place]  08/11/2020 06/09/2020 3   Dextroamp-Amphetamin 20 Mg Tab  60.00 30 Ro Cli [id number] [place] (1878) 0  [place]  06/13/2020 06/09/2020 3   Dextroamp-Amphetamin 20 Mg Tab  60.00 30 Ro Cli [id number] Lov (1878) 0  [place]  04/28/2020 02/20/2020 3   Dextroamp-Amphetamin 20 Mg Tab  60.00 30 Ro Cli [id number] Lov (1878) 0  [place]  03/27/2020 02/27/2020 3   Dextroamp-Amphetamin 20 Mg Tab  60.00 30 Ro Cli [id number] Lov (1878) 0  [place]  02/28/2020 02/27/2020 3   Dextroamp-Amphetamin 20 Mg Tab  60.00 30 Ro Cli [id number] Lov (1878) 0  [place]  01/24/2020 11/18/2019 3   Dextroamp-Amphetamin 20 Mg Tab  60.00 30 Ro Cli [id number] [place] (1878) 0  [place]  Showing 1-15 of 17 Items View 15 Items    1   of 2   Providers  Total: 1  Showing 1 Item View 15 Items  1 of 1   Name  Address  City  State  Zipcode  Phone   [place]a [place] [address] [place] [address] [phone]  Showing 1 Item View 15 Items  1 of 1   Pharmacies  Total: 2  Showing 1-2 of 2 Items View 15 Items  1 of 1   Name  Address  [facility]  State  Zipcode  Phone   [facility] [phone]) [phone] [facility] [place] [address] [phone]  [facility] [phone]) [address] [place] [address] [phone]  Assessment and Plan (Medical Justification)      Julieta was seen today for establish care.    Diagnoses and all orders for this visit:    Establishing care with new doctor, encounter for  Comments:  normal exam, except as noted    Multiple subsolid lung nodules less than 6 mm in diameter  -     CT Chest With Contrast; Future  -     Comprehensive Metabolic Panel; Future  -     Comprehensive Metabolic Panel    Other nonspecific abnormal finding of lung field  -     CT Chest With Contrast; Future         Follow up for New symptoms, Worsening symptoms.       Total time spent:  45min    Available Notes, Procedures and Results, including Labs/Imaging, from the last 3 months were reviewed.    Risks, benefits, and side effects were discussed with the patient. All questions were answered to the fullest satisfaction of the patient, and pt verbalized understanding and agreement to treatment plan. Pt was to call with any new or worsening symptoms, or present to the ER.    Patient instructed that best way to communicate with my office staff is for patient to get on the [facility] patient portal to expedite communication and communication issues that may occur.  Patient was given instructions on how to get on the portal.  I encouraged patient to obtain portal access as  well.  Ultimately it is up to the patient to obtain access.  Patient voiced understanding.          Haris Purdy M.D.  Family Medicine Physician  [facility]

## 2021-11-09 NOTE — NURSING
Venipuncture performed with 21 gauge butterfly, x's 1 attempt.     Specimens collected per orders.     Pressure dressing applied to site, instructed patient to remove dressing in 10-15 minutes, OK to re-adjust dressing if pressure causing any discomfort, to observe closely for numbness and/or discoloration to hand or fingers, and to notify provider if bleeding persists after applying constant pressure lasting 30 minutes.

## 2021-11-10 ENCOUNTER — PATIENT OUTREACH (OUTPATIENT)
Dept: ADMINISTRATIVE | Facility: HOSPITAL | Age: 36
End: 2021-11-10
Payer: COMMERCIAL

## 2021-12-17 ENCOUNTER — HOSPITAL ENCOUNTER (OUTPATIENT)
Dept: RADIOLOGY | Facility: HOSPITAL | Age: 36
Discharge: HOME | End: 2021-12-17
Attending: FAMILY MEDICINE
Payer: COMMERCIAL

## 2021-12-17 DIAGNOSIS — R91.8 MULTIPLE SUBSOLID LUNG NODULES LESS THAN 6 MM IN DIAMETER: ICD-10-CM

## 2021-12-17 DIAGNOSIS — R91.8 OTHER NONSPECIFIC ABNORMAL FINDING OF LUNG FIELD: ICD-10-CM

## 2021-12-17 PROCEDURE — 71260 CT THORAX DX C+: CPT | Mod: 26,,, | Performed by: RADIOLOGY

## 2021-12-17 PROCEDURE — 71260 CT CHEST WITH CONTRAST: ICD-10-PCS | Mod: 26,,, | Performed by: RADIOLOGY

## 2021-12-17 PROCEDURE — 25500020 PHARM REV CODE 255: Performed by: FAMILY MEDICINE

## 2021-12-17 PROCEDURE — 71260 CT THORAX DX C+: CPT | Mod: TC

## 2021-12-17 RX ADMIN — IOHEXOL 75 ML: 350 INJECTION, SOLUTION INTRAVENOUS at 09:12

## 2022-01-12 ENCOUNTER — CLINICAL SUPPORT (OUTPATIENT)
Dept: OBSTETRICS AND GYNECOLOGY | Facility: CLINIC | Age: 37
End: 2022-01-12
Payer: COMMERCIAL

## 2022-01-12 DIAGNOSIS — Z30.42 ENCOUNTER FOR MANAGEMENT AND INJECTION OF INJECTABLE PROGESTIN CONTRACEPTIVE: Primary | ICD-10-CM

## 2022-01-12 PROCEDURE — 96372 PR INJECTION,THERAP/PROPH/DIAG2ST, IM OR SUBCUT: ICD-10-PCS | Mod: S$GLB,,, | Performed by: ADVANCED PRACTICE MIDWIFE

## 2022-01-12 PROCEDURE — 96372 THER/PROPH/DIAG INJ SC/IM: CPT | Mod: S$GLB,,, | Performed by: ADVANCED PRACTICE MIDWIFE

## 2022-01-12 RX ADMIN — MEDROXYPROGESTERONE ACETATE 150 MG: 150 INJECTION, SUSPENSION INTRAMUSCULAR at 08:01

## 2022-02-01 ENCOUNTER — TELEPHONE (OUTPATIENT)
Dept: FAMILY MEDICINE | Facility: CLINIC | Age: 37
End: 2022-02-01
Payer: COMMERCIAL

## 2022-02-01 NOTE — TELEPHONE ENCOUNTER
----- Message from Karo Lowry sent at 2/1/2022  8:13 AM CST -----  Type: Needs Medical Advice  Who Called:  patient   Best Call Back Number: [phone]   Additional Information: per patient requesting a call back to discuss plan of care and follow up from CT results-please advise-thank you

## 2022-02-02 ENCOUNTER — OFFICE VISIT (OUTPATIENT)
Dept: FAMILY MEDICINE | Facility: CLINIC | Age: 37
End: 2022-02-02
Payer: COMMERCIAL

## 2022-02-02 DIAGNOSIS — J45.20 MILD INTERMITTENT ASTHMA WITHOUT COMPLICATION: ICD-10-CM

## 2022-02-02 DIAGNOSIS — J30.2 SEASONAL ALLERGIES: ICD-10-CM

## 2022-02-02 DIAGNOSIS — R91.8 MULTIPLE SUBSOLID LUNG NODULES LESS THAN 6 MM IN DIAMETER: Primary | Chronic | ICD-10-CM

## 2022-02-02 PROCEDURE — 99214 OFFICE O/P EST MOD 30 MIN: CPT | Mod: 95,,, | Performed by: FAMILY MEDICINE

## 2022-02-02 PROCEDURE — 99214 PR OFFICE/OUTPT VISIT, EST, LEVL IV, 30-39 MIN: ICD-10-PCS | Mod: 95,,, | Performed by: FAMILY MEDICINE

## 2022-02-02 RX ORDER — ALBUTEROL SULFATE 1.25 MG/3ML
1.25 SOLUTION RESPIRATORY (INHALATION)
Qty: 75 ML | Refills: 3 | Status: SHIPPED | OUTPATIENT
Start: 2022-02-02 | End: 2022-02-02

## 2022-02-02 RX ORDER — AZELASTINE 1 MG/ML
2 SPRAY, METERED NASAL 2 TIMES DAILY
Qty: 60 ML | Refills: 3 | Status: SHIPPED | OUTPATIENT
Start: 2022-02-02 | End: 2023-02-02

## 2022-02-02 NOTE — PROGRESS NOTES
Chief Complaint   Patient presents with    Follow-up          The patient location is: home  The chief complaint leading to consultation is:   Chief Complaint   Patient presents with    Follow-up       Visit type: Virtual visit with synchronous audio and video           Face to Face time with patient:      30 minutes of total time spent on the encounter, which includes face to face time and non-face to face time preparing to see the patient (eg, review of tests), Obtaining and/or reviewing separately obtained history, Documenting clinical information in the electronic or other health record, Independently interpreting results (not separately reported) and communicating results to the patient/family/caregiver, or Care coordination (not separately reported).           Each patient to whom I provide medical services by telemedicine is:  (1) informed of the relationship between the physician and patient and the respective role of any other health care provider with respect to management of the patient; and (2) notified that they may decline to receive medical services by telemedicine and may withdraw from such care at any time. Patient verbally consented to receive this service via  synchronous audio and video      HPI    Patient is following up: CT results  Current complaints/concerns:  *  *  *  *      CT CHEST WITH CONTRAST     CLINICAL HISTORY:  Lung nodule, multiple < 6mm;Lung nodule, multiple < 6mm, stable on prior exam;previous Lung nodules, largest 5.5mm;former smoker with 15 pack-year history of smoking     TECHNIQUE:  Low dose axial images, sagittal and coronal reformations were obtained from the thoracic inlet to the lung bases following the IV administration of 75 mL of Omnipaque 350.     COMPARISON:  CT abdomen and pelvis 06/17/2018     FINDINGS:  There is no mediastinal, hilar or axillary adenopathy.  No pleural effusion is present.  The aorta is normal in caliber.     There is an azygous lobe, a normal  variant.     There are a few faint 2-3 mm subpleural nodules in both upper lobes and the right middle lobe.     In addition, there is a stable 5 mm solid nodule in the right middle lobe series 3, image 73.     There is a stable 6 mm solid nodule in the right lower lobe series 3, image 82.     There is a nodular focus of pleural thickening in the posterior left upper lobe series 3, image 37.     9 mm focus of bright arterial phase enhancement in the medial segment of the left lobe of the liver superiorly series 2, image 101 is not visualized on the previous study, but there is significant difference in the contrast bolus timing between the 2 exams.  This is likely an incidental hemangioma or vascular anomaly.     Impression:     Nodules in the lung bases are stable since 2018, benign.  Additional tiny nodules in the upper lung zones not included in the field of view previously are also considered to be benign findings.  Given the patient's smoking history, consideration could be given to annual low-dose CT lung screening.     9 mm incidental arterial phase focus of enhancement in the liver.  In a patient with no history of malignancy or cirrhosis/risk factors for hepatocellular carcinoma, no further workup is needed.        Electronically signed by: Mindy Giron  Date:                                            12/17/2021  Time:                                           09:41             Exam Ended: 12/17/21 09:16 Last Resulted: 12/17/21 09:41               Review of patient's allergies indicates:  No Known Allergies   Outpatient Medications as of 2/2/2022   Medication Sig Dispense Refill    ARIPiprazole (ABILIFY) 5 MG Tab Take 5 mg by mouth.      citalopram (CELEXA) 20 MG tablet Take 20 mg by mouth once daily.      dextroamphetamine-amphetamine (ADDERALL) 20 mg tablet Take 1 tablet by mouth 2 (two) times daily.       Facility-Administered Medications as of 2/2/2022   Medication Dose Route Frequency  Provider Last Rate Last Admin    medroxyPROGESTERone (DEPO-PROVERA) injection 150 mg  150 mg Intramuscular Q90 Days Felicia Rubio, NP   150 mg at 01/12/22 0826      Past Medical History:   Diagnosis Date    ADD (attention deficit disorder)     Anxiety     Anxiety     Depression     Hypertension      Past Surgical History:   Procedure Laterality Date    DIAGNOSTIC LAPAROSCOPY N/A 6/17/2018    Procedure: LAPAROSCOPY, DIAGNOSTIC;  Surgeon: Win Uribe MD;  Location: [facility];  Service: OB/GYN;  Laterality: N/A;    OVARIAN CYST SURGERY Left      Social History     Tobacco Use    Smoking status: Former Smoker     Packs/day: 1.00     Years: 15.00     Pack years: 15.00     Types: Cigarettes     Quit date: 8/17/2018     Years since quitting: 3.5    Smokeless tobacco: Never Used   Substance Use Topics    Alcohol use: Yes    Drug use: Never       Review of Systems   Constitutional: Negative for fever and malaise/fatigue.   HENT: Positive for congestion. Negative for hearing loss.    Eyes: Negative for discharge.   Respiratory: Positive for cough and sputum production. Negative for shortness of breath and wheezing.    Cardiovascular: Negative for chest pain and palpitations.   Gastrointestinal: Negative for blood in stool, constipation, diarrhea and vomiting.   Genitourinary: Negative for dysuria and hematuria.   Musculoskeletal: Negative for neck pain.   Neurological: Negative for weakness and headaches.   Endo/Heme/Allergies: Negative for polydipsia.   Answers for HPI/ROS submitted by the patient on 2/2/2022  activity change: No  unexpected weight change: Yes  rhinorrhea: No  trouble swallowing: No  visual disturbance: No  chest tightness: No  polyuria: No  difficulty urinating: No  menstrual problem: No  joint swelling: No  arthralgias: No  confusion: No  dysphoric mood: No      No Vitals available as patient not present in clinic          ASSESSMENT and PLAN    Julieta was seen today for  follow-up.    Diagnoses and all orders for this visit:    Multiple subsolid lung nodules less than 6 mm in diameter  Comments:  continue with annual LDCT    Seasonal allergies  -     azelastine (ASTELIN) 137 mcg (0.1 %) nasal spray; 2 sprays (274 mcg total) by Nasal route 2 (two) times daily.    Mild intermittent asthma without complication  -     Discontinue: albuterol (ACCUNEB) 1.25 mg/3 mL Nebu; Take 3 mLs (1.25 mg total) by nebulization every 2 (two) hours as needed (cough, SHOB, Wheezing). Rescue            Follow up in about 3 months (around 5/2/2022) for New symptoms, Worsening symptoms.      Total time spent: 30  minutes    Available Notes, Procedures and Results, including Labs/Imaging, from the last 3 months were reviewed.    Risks, benefits, and side effects were discussed with the patient. All questions were answered to the fullest satisfaction of the patient, and pt verbalized understanding and agreement to treatment plan. Pt was to call with any new or worsening symptoms, or present to the ER.              Haris Purdy M.D.  Family Medicine Physician  [facility]

## 2022-02-20 PROBLEM — J45.20 MILD INTERMITTENT ASTHMA WITHOUT COMPLICATION: Status: ACTIVE | Noted: 2022-02-20

## 2022-02-20 PROBLEM — J30.2 SEASONAL ALLERGIES: Status: ACTIVE | Noted: 2022-02-20

## 2022-03-30 ENCOUNTER — CLINICAL SUPPORT (OUTPATIENT)
Dept: OBSTETRICS AND GYNECOLOGY | Facility: CLINIC | Age: 37
End: 2022-03-30
Payer: COMMERCIAL

## 2022-03-30 DIAGNOSIS — Z30.9 ENCOUNTER FOR CONTRACEPTIVE MANAGEMENT, UNSPECIFIED TYPE: Primary | ICD-10-CM

## 2022-03-30 PROCEDURE — 96372 PR INJECTION,THERAP/PROPH/DIAG2ST, IM OR SUBCUT: ICD-10-PCS | Mod: S$GLB,,, | Performed by: NURSE PRACTITIONER

## 2022-03-30 PROCEDURE — 96372 THER/PROPH/DIAG INJ SC/IM: CPT | Mod: S$GLB,,, | Performed by: NURSE PRACTITIONER

## 2022-03-30 RX ADMIN — MEDROXYPROGESTERONE ACETATE 150 MG: 150 INJECTION, SUSPENSION INTRAMUSCULAR at 08:03

## 2022-03-30 NOTE — PROGRESS NOTES
Patient presents for scheduled Depo-Provera injection.  UPT negative.  Medroxyprogesterone 150 mg IM given per provider order.  See MAR for further details.  Patient tolerated well.  No signs or symptoms of adverse reaction after 15 minutes.  Patient instructed to return for next dose in 3 months.

## 2022-11-13 ENCOUNTER — PATIENT MESSAGE (OUTPATIENT)
Dept: FAMILY MEDICINE | Facility: CLINIC | Age: 37
End: 2022-11-13
Payer: COMMERCIAL

## 2023-03-01 ENCOUNTER — OFFICE VISIT (OUTPATIENT)
Dept: OBSTETRICS AND GYNECOLOGY | Facility: CLINIC | Age: 38
End: 2023-03-01
Payer: COMMERCIAL

## 2023-03-01 VITALS
HEIGHT: 70 IN | SYSTOLIC BLOOD PRESSURE: 128 MMHG | DIASTOLIC BLOOD PRESSURE: 70 MMHG | WEIGHT: 193 LBS | BODY MASS INDEX: 27.63 KG/M2

## 2023-03-01 DIAGNOSIS — Z30.013 ENCOUNTER FOR INITIAL PRESCRIPTION OF INJECTABLE CONTRACEPTIVE: ICD-10-CM

## 2023-03-01 DIAGNOSIS — Z00.00 WELLNESS EXAMINATION: Primary | ICD-10-CM

## 2023-03-01 LAB
B-HCG UR QL: NEGATIVE
CTP QC/QA: YES

## 2023-03-01 PROCEDURE — 99395 PR PREVENTIVE VISIT,EST,18-39: ICD-10-PCS | Mod: 25,S$GLB,, | Performed by: NURSE PRACTITIONER

## 2023-03-01 PROCEDURE — 96372 THER/PROPH/DIAG INJ SC/IM: CPT | Mod: S$GLB,,, | Performed by: NURSE PRACTITIONER

## 2023-03-01 PROCEDURE — 99395 PREV VISIT EST AGE 18-39: CPT | Mod: 25,S$GLB,, | Performed by: NURSE PRACTITIONER

## 2023-03-01 PROCEDURE — 88175 CYTOPATH C/V AUTO FLUID REDO: CPT | Performed by: NURSE PRACTITIONER

## 2023-03-01 PROCEDURE — 81025 URINE PREGNANCY TEST: CPT | Mod: S$GLB,,, | Performed by: NURSE PRACTITIONER

## 2023-03-01 PROCEDURE — 96372 PR INJECTION,THERAP/PROPH/DIAG2ST, IM OR SUBCUT: ICD-10-PCS | Mod: S$GLB,,, | Performed by: NURSE PRACTITIONER

## 2023-03-01 PROCEDURE — 81025 POCT URINE PREGNANCY: ICD-10-PCS | Mod: S$GLB,,, | Performed by: NURSE PRACTITIONER

## 2023-03-01 RX ORDER — MEDROXYPROGESTERONE ACETATE 150 MG/ML
150 INJECTION, SUSPENSION INTRAMUSCULAR
Status: SHIPPED | OUTPATIENT
Start: 2023-03-01 | End: 2024-02-24

## 2023-03-01 RX ADMIN — MEDROXYPROGESTERONE ACETATE 150 MG: 150 INJECTION, SUSPENSION INTRAMUSCULAR at 10:03

## 2023-03-01 NOTE — PROGRESS NOTES
Patient presents for scheduled Depo-Provera injection.  UPT negative.  Medroxyprogesterone 150 mg IM given per provider order.  See MAR for further details.  Patient tolerated well.  No signs or symptoms of adverse reaction after 15 minutes.  Patient instructed to return for next dose in 3 months.     no

## 2023-03-01 NOTE — PROGRESS NOTES
CC: Well woman exam    Julieta Pacheco is a 37 y.o. female  presents for well woman exam.  LMP: Patient's last menstrual period was 2023 (exact date)..  No issues, problems, or complaints. Patients last pap was 2020, pap smear today.  Last wellness labs were done unknown.  Last mammogram was never. She is a non smoker . Denies any anxiety and depression. She uses a seat belt while riding in the car.  Eating well and exercising on occasions. The current method of family planning is none. However, she would like to start depo       Chief Complaint   Patient presents with    Well Woman        Past Medical History:   Diagnosis Date    ADD (attention deficit disorder)     Anxiety     Anxiety     Depression      Past Surgical History:   Procedure Laterality Date    DIAGNOSTIC LAPAROSCOPY N/A 2018    Procedure: LAPAROSCOPY, DIAGNOSTIC;  Surgeon: Win Uribe MD;  Location: [facility] OR;  Service: OB/GYN;  Laterality: N/A;    NASAL SEPTOPLASTY      NASAL TURBINATE REDUCTION      OVARIAN CYST SURGERY Left     TONSILLECTOMY AND ADENOIDECTOMY       Social History     Socioeconomic History    Marital status: Single   Tobacco Use    Smoking status: Former     Packs/day: 1.00     Years: 15.00     Pack years: 15.00     Types: Cigarettes     Quit date: 2018     Years since quittin.5    Smokeless tobacco: Never   Substance and Sexual Activity    Alcohol use: Yes    Drug use: Never    Sexual activity: Yes     Partners: Male     Birth control/protection: None     Family History   Problem Relation Age of Onset    Hypertension Father     Diabetes Father     Hypertension Mother     Hypertension Brother     Diabetes Brother     Hypertension Sister     Diabetes Sister     Thyroid nodules Sister     Hypothyroidism Sister     Breast cancer Maternal Aunt     Hyperthyroidism Sister     Lung cancer Neg Hx     Colon cancer Neg Hx      OB History          2    Para   2    Term   2       0    AB   0     "Living   2         SAB   0    IAB   0    Ectopic   0    Multiple   0    Live Births   2                 /70 (BP Location: Left arm, Patient Position: Sitting)   Ht 5' 9.5" (1.765 m)   Wt 87.5 kg (193 lb)   LMP 02/03/2023 (Exact Date)   BMI 28.09 kg/m²       ROS:  GENERAL: Denies weight gain or weight loss. Feeling well overall.   SKIN: Denies rash or lesions.   HEAD: Denies head injury or headache.   NODES: Denies enlarged lymph nodes.   CHEST: Denies chest pain or shortness of breath.   CARDIOVASCULAR: Denies palpitations or left sided chest pain.   ABDOMEN: No abdominal pain, constipation, diarrhea, nausea, vomiting or rectal bleeding.   URINARY: No frequency, dysuria, hematuria, or burning on urination.  REPRODUCTIVE: See HPI.   BREASTS: The patient performs breast self-examination and denies pain, lumps, or nipple discharge.   HEMATOLOGIC: No easy bruisability or excessive bleeding.   MUSCULOSKELETAL: Denies joint pain or swelling.   NEUROLOGIC: Denies syncope or weakness.   PSYCHIATRIC: Denies depression, anxiety or mood swings.    PHYSICAL EXAM:  Chaperone present for exam  APPEARANCE: Well nourished, well developed, in no acute distress.  AFFECT: WNL, alert and oriented x 3  SKIN: No acne or hirsutism  NECK: Neck symmetric without masses or thyromegaly  NODES: No inguinal, cervical, or axillary lymph node enlargement  CHEST: Good respiratory effect  ABDOMEN: Soft.  No tenderness or masses.  No hepatosplenomegaly.  No hernias.  BREASTS: Symmetrical, no skin changes or visible lesions.  No palpable masses, nipple discharge bilaterally.  PELVIC: Normal external genitalia without lesions. Normal urethral meatus.  Vagina without lesions or discharge.  Cervix without lesions, discharge or tenderness.  No significant cystocele or rectocele.  Bimanual exam shows uterus to be normal size, regular, mobile and nontender.  Adnexa without masses or tenderness.    EXTREMITIES: No edema.      Wellness " examination  -     Liquid-Based Pap Smear, Screening    Other orders  -     medroxyPROGESTERone (DEPO-PROVERA) injection 150 mg        Return in 1 year or PRN sooner.  Patient was counseled today on A.C.S. Pap guidelines and recommendations for yearly pelvic exams, mammograms and monthly self breast exams; to see her PCP for other health maintenance. Contraception was discussed with the patient she expressed understanding.  STD education counseling was performed during this visit patient expressed understanding.      No follow-ups on file.    RIAZ Patton

## 2023-03-06 LAB
FINAL PATHOLOGIC DIAGNOSIS: NORMAL
Lab: NORMAL

## 2023-07-13 ENCOUNTER — PATIENT MESSAGE (OUTPATIENT)
Dept: ADMINISTRATIVE | Facility: HOSPITAL | Age: 38
End: 2023-07-13
Payer: COMMERCIAL

## (undated) DEVICE — TROCAR ENDO Z THREAD KII 5X100

## (undated) DEVICE — FILTER LAPAROSCOPIC SMOKE EVAC

## (undated) DEVICE — SUT CTD VICRYL 4-0 BR PS-2

## (undated) DEVICE — Device

## (undated) DEVICE — ELECTRODE REM PLYHSV RETURN 9

## (undated) DEVICE — NDL INSUF ULTRA VERESS 120MM

## (undated) DEVICE — TRAY DRY SKIN SCRUB PREP

## (undated) DEVICE — TROCAR ENDOPATH XCEL 11MM 10CM

## (undated) DEVICE — IRRIGATOR ENDOSCOPY DISP.

## (undated) DEVICE — LEGGING CLEAR POLY 2/PACK

## (undated) DEVICE — ELECTRODE SPATULA 5MMX 32CM

## (undated) DEVICE — SUT 0 VICRYL / UR6 (J603)

## (undated) DEVICE — SLEEVE SCD EXPRESS CALF MEDIUM

## (undated) DEVICE — PAD SANITARY OB STERILE

## (undated) DEVICE — SEE MEDLINE ITEM 156964

## (undated) DEVICE — ADHESIVE DERMABOND ADVANCED

## (undated) DEVICE — GLOVE SURGEONS ULTRA TOUCH 6.5

## (undated) DEVICE — TUBING INSUFFLATION HEATED

## (undated) DEVICE — SOL NACL IRR 3000ML

## (undated) DEVICE — CANNULA LAP SEAL Z THRD 5X100

## (undated) DEVICE — GLOVE SURG ULTRA TOUCH 7

## (undated) DEVICE — CANISTER SUCTION 3000CC